# Patient Record
Sex: FEMALE | Race: WHITE | URBAN - METROPOLITAN AREA
[De-identification: names, ages, dates, MRNs, and addresses within clinical notes are randomized per-mention and may not be internally consistent; named-entity substitution may affect disease eponyms.]

---

## 2020-05-06 ENCOUNTER — VIRTUAL VISIT (OUTPATIENT)
Dept: FAMILY MEDICINE CLINIC | Age: 55
End: 2020-05-06

## 2020-05-06 ENCOUNTER — TELEPHONE (OUTPATIENT)
Dept: FAMILY MEDICINE CLINIC | Age: 55
End: 2020-05-06

## 2020-05-06 DIAGNOSIS — K21.9 GASTROESOPHAGEAL REFLUX DISEASE WITHOUT ESOPHAGITIS: ICD-10-CM

## 2020-05-06 DIAGNOSIS — I10 ESSENTIAL HYPERTENSION: Primary | ICD-10-CM

## 2020-05-06 RX ORDER — HYDROCHLOROTHIAZIDE 12.5 MG/1
12.5 TABLET ORAL DAILY
Qty: 30 TAB | Refills: 1 | Status: SHIPPED | OUTPATIENT
Start: 2020-05-06 | End: 2020-06-29

## 2020-05-06 RX ORDER — PRAZOSIN HYDROCHLORIDE 1 MG/1
1 CAPSULE ORAL
COMMUNITY

## 2020-05-06 RX ORDER — DILTIAZEM HYDROCHLORIDE 240 MG/1
240 CAPSULE, EXTENDED RELEASE ORAL DAILY
Qty: 30 CAP | Refills: 1 | Status: SHIPPED | OUTPATIENT
Start: 2020-05-06 | End: 2020-06-29

## 2020-05-06 RX ORDER — BUSPIRONE HYDROCHLORIDE 30 MG/1
30 TABLET ORAL DAILY
COMMUNITY

## 2020-05-06 RX ORDER — LISINOPRIL 40 MG/1
40 TABLET ORAL DAILY
COMMUNITY
End: 2020-05-06 | Stop reason: SDUPTHER

## 2020-05-06 RX ORDER — FAMOTIDINE 40 MG/1
40 TABLET, FILM COATED ORAL DAILY
Qty: 30 TAB | Refills: 1 | Status: SHIPPED | OUTPATIENT
Start: 2020-05-06 | End: 2020-06-29

## 2020-05-06 RX ORDER — LISINOPRIL 40 MG/1
40 TABLET ORAL DAILY
Qty: 30 TAB | Refills: 1 | Status: SHIPPED | OUTPATIENT
Start: 2020-05-06 | End: 2020-06-29

## 2020-05-06 RX ORDER — PANTOPRAZOLE SODIUM 40 MG/1
40 TABLET, DELAYED RELEASE ORAL DAILY
COMMUNITY

## 2020-05-06 RX ORDER — ALBUTEROL SULFATE 0.83 MG/ML
SOLUTION RESPIRATORY (INHALATION) ONCE
COMMUNITY

## 2020-05-06 RX ORDER — PREGABALIN 100 MG/1
100 CAPSULE ORAL 2 TIMES DAILY
COMMUNITY
End: 2020-05-07 | Stop reason: ALTCHOICE

## 2020-05-06 RX ORDER — DILTIAZEM HYDROCHLORIDE 240 MG/1
240 CAPSULE, EXTENDED RELEASE ORAL DAILY
COMMUNITY
End: 2020-05-06 | Stop reason: SDUPTHER

## 2020-05-06 RX ORDER — DULOXETIN HYDROCHLORIDE 60 MG/1
60 CAPSULE, DELAYED RELEASE ORAL DAILY
COMMUNITY
End: 2020-05-12 | Stop reason: SDUPTHER

## 2020-05-06 RX ORDER — CLONAZEPAM 0.25 MG/1
0.25 TABLET, ORALLY DISINTEGRATING ORAL
COMMUNITY
End: 2020-05-12 | Stop reason: ALTCHOICE

## 2020-05-06 NOTE — PROGRESS NOTES
Bee New is a 48 y.o. female who was seen by synchronous (real-time) audio-video technology on 5/6/2020. Consent: Bee New, who was seen by synchronous (real-time) audio-video technology, and/or her healthcare decision maker, is aware that this patient-initiated, Telehealth encounter on 5/6/2020 is a billable service, with coverage as determined by her insurance carrier. She is aware that she may receive a bill and has provided verbal consent to proceed: Yes. I spent at least 23 minutes on this visit with this established patient. [phone]  Subjective:   Bee New is a 48 y.o. female who was seen for Medication Refill  new patient moved from [place]  Has bp meds that she needs refills of  bp has been high but out of her hctz, bp 150/100  See med list for updated regimen  Also having break through gerd on protonix      Prior to Admission medications    Medication Sig Start Date End Date Taking? Authorizing Provider   lurasidone (Latuda) 40 mg tab tablet Take 40 mg by mouth. Yes Provider, Historical   busPIRone (BUSPAR) 30 mg tablet Take 30 mg by mouth daily. Yes Provider, Historical   clonazePAM (KlonoPIN) 0.25 mg TbDi Take 0.25 mg by mouth. Yes Provider, Historical   prazosin (MINIPRESS) 1 mg capsule Take 1 mg by mouth nightly. Yes Provider, Historical   pregabalin (LYRICA) 100 mg capsule Take 100 mg by mouth two (2) times a day. Yes Provider, Historical   DULoxetine (CYMBALTA) 60 mg capsule Take 60 mg by mouth daily. Yes Provider, Historical   pantoprazole (PROTONIX) 40 mg tablet Take 40 mg by mouth daily. Yes Provider, Historical   albuterol (PROVENTIL VENTOLIN) 2.5 mg /3 mL (0.083 %) nebu by Nebulization route once. Yes Provider, Historical   dilTIAZem ER (DILACOR XR) 240 mg capsule Take 1 Cap by mouth daily. 5/6/20  Yes Shania French NP   lisinopriL (PRINIVIL, ZESTRIL) 40 mg tablet Take 1 Tab by mouth daily.  5/6/20  Yes Shania French NP   hydroCHLOROthiazide (HYDRODIURIL) 12.5 mg tablet Take 1 Tab by mouth daily. 5/6/20  Yes Otis Tello NP   famotidine (PEPCID) 40 mg tablet Take 1 Tab by mouth daily. 5/6/20  Yes Otis Tello NP   dilTIAZem ER (DILACOR XR) 240 mg capsule Take 240 mg by mouth daily. 5/6/20  Provider, Historical   lisinopriL (PRINIVIL, ZESTRIL) 40 mg tablet Take 40 mg by mouth daily. 5/6/20  Provider, Historical     Allergies   Allergen Reactions    Tramadol Anaphylaxis    Aspirin Hives       There are no active problems to display for this patient. ROS  A comprehensive review of system was obtained and negative except findings in the HPI      Objective: There were no vitals taken for this visit. General: alert, cooperative, no distress   Mental  status: normal mood, behavior, speech, dress, motor activity, and thought processes, able to follow commands   HENT: NCAT   Neck: no visualized mass   Resp: no respiratory distress   Neuro: no gross deficits   Skin: no discoloration or lesions of concern on visible areas   Psychiatric: normal affect, consistent with stated mood, no evidence of hallucinations     Additional exam findings: none    Diagnoses and all orders for this visit:    1. Essential hypertension    2. Gastroesophageal reflux disease without esophagitis    Other orders  -     dilTIAZem ER (DILACOR XR) 240 mg capsule; Take 1 Cap by mouth daily. -     lisinopriL (PRINIVIL, ZESTRIL) 40 mg tablet; Take 1 Tab by mouth daily. -     hydroCHLOROthiazide (HYDRODIURIL) 12.5 mg tablet; Take 1 Tab by mouth daily. -     famotidine (PEPCID) 40 mg tablet; Take 1 Tab by mouth daily. Add pepcid for gerd  Will refer to GI when office reopens  Refilled bp meds and added back the hctz  Will update meds/labs when office reopens  Will seek psych referral to get those meds updated      We discussed the expected course, resolution and complications of the diagnosis(es) in detail.   Medication risks, benefits, costs, interactions, and alternatives were discussed as indicated. I advised her to contact the office if her condition worsens, changes or fails to improve as anticipated. She expressed understanding with the diagnosis(es) and plan. Raj Skaggs is a 48 y.o. female who was evaluated by a video visit encounter for concerns as above. Patient identification was verified prior to start of the visit. A caregiver was present when appropriate. Due to this being a TeleHealth encounter (During XJZDW-24 public health emergency), evaluation of the following organ systems was limited: Vitals/Constitutional/EENT/Resp/CV/GI//MS/Neuro/Skin/Heme-Lymph-Imm. Pursuant to the emergency declaration under the [facility], 1135 waiver authority and the [employer] and [employer] Act, this Virtual  Visit was conducted, with patient's (and/or legal guardian's) consent, to reduce the patient's risk of exposure to COVID-19 and provide necessary medical care. Services were provided through a video synchronous discussion virtually to substitute for in-person clinic visit. Patient and provider were located at their individual homes.       Augustine Osbornr, KENNEY

## 2020-05-07 ENCOUNTER — TELEPHONE (OUTPATIENT)
Dept: FAMILY MEDICINE CLINIC | Age: 55
End: 2020-05-07

## 2020-05-07 DIAGNOSIS — M51.36 DDD (DEGENERATIVE DISC DISEASE), LUMBAR: Primary | ICD-10-CM

## 2020-05-07 RX ORDER — GABAPENTIN 300 MG/1
300 CAPSULE ORAL 2 TIMES DAILY
Qty: 60 CAP | Refills: 2 | Status: SHIPPED | OUTPATIENT
Start: 2020-05-07 | End: 2020-07-28

## 2020-05-12 ENCOUNTER — VIRTUAL VISIT (OUTPATIENT)
Dept: FAMILY MEDICINE CLINIC | Age: 55
End: 2020-05-12

## 2020-05-12 DIAGNOSIS — F41.9 ANXIETY: Primary | ICD-10-CM

## 2020-05-12 RX ORDER — CLONAZEPAM 0.5 MG/1
0.5 TABLET ORAL
Qty: 60 TAB | Refills: 0 | Status: SHIPPED | OUTPATIENT
Start: 2020-05-12 | End: 2020-06-10 | Stop reason: SDUPTHER

## 2020-05-12 RX ORDER — DULOXETIN HYDROCHLORIDE 60 MG/1
60 CAPSULE, DELAYED RELEASE ORAL DAILY
Qty: 60 CAP | Refills: 0 | Status: SHIPPED | OUTPATIENT
Start: 2020-05-12 | End: 2020-07-05

## 2020-05-12 NOTE — PROGRESS NOTES
Mary Weaver is a 48 y.o. female who was seen by synchronous (real-time) audio-video technology on 2020. Consent: Mary Weaver, who was seen by synchronous (real-time) audio-video technology, and/or her healthcare decision maker, is aware that this patient-initiated, Telehealth encounter on 2020 is a billable service, with coverage as determined by her insurance carrier. She is aware that she may receive a bill and has provided verbal consent to proceed: Yes. I spent at least 23 minutes on this visit with this established patient. [id number]  Subjective:   Mary Weaver is a 48 y.o. female who was seen for Anxiety  unable to get refills of meds for anxiety  Mom  last mo and mothers day was rough emotionally  Has not had her anxiety meds in over a month since moving her to live with daughter   reviewed    Prior to Admission medications    Medication Sig Start Date End Date Taking? Authorizing Provider   DULoxetine (CYMBALTA) 60 mg capsule Take 1 Cap by mouth daily. 20  Yes Isabel Buchanan NP   clonazePAM (KlonoPIN) 0.5 mg tablet Take 1 Tab by mouth two (2) times daily as needed for Anxiety. Max Daily Amount: 1 mg. 20  Yes Isabel Buchanan NP   gabapentin (NEURONTIN) 300 mg capsule Take 1 Cap by mouth two (2) times a day. Max Daily Amount: 600 mg. 20   Isabel Buchanan NP   lurasidone Orpah Savers) 40 mg tab tablet Take 40 mg by mouth. Provider, Historical   busPIRone (BUSPAR) 30 mg tablet Take 30 mg by mouth daily. Provider, Historical   prazosin (MINIPRESS) 1 mg capsule Take 1 mg by mouth nightly. Provider, Historical   pantoprazole (PROTONIX) 40 mg tablet Take 40 mg by mouth daily. Provider, Historical   albuterol (PROVENTIL VENTOLIN) 2.5 mg /3 mL (0.083 %) nebu by Nebulization route once. Provider, Historical   dilTIAZem ER (DILACOR XR) 240 mg capsule Take 1 Cap by mouth daily.  20   Isabel Buchanan NP   lisinopriL (PRINIVIL, ZESTRIL) 40 mg tablet Take 1 Tab by mouth daily. 5/6/20   Ben Argueta NP   hydroCHLOROthiazide (HYDRODIURIL) 12.5 mg tablet Take 1 Tab by mouth daily. 5/6/20   Ben Argueta NP   famotidine (PEPCID) 40 mg tablet Take 1 Tab by mouth daily. 5/6/20   Ben Argueta NP   clonazePAM (KlonoPIN) 0.25 mg TbDi Take 0.25 mg by mouth. 5/12/20  Provider, Historical   DULoxetine (CYMBALTA) 60 mg capsule Take 60 mg by mouth daily. 5/12/20  Provider, Historical     Allergies   Allergen Reactions    Tramadol Anaphylaxis    Aspirin Hives       There are no active problems to display for this patient. ROS      Objective: There were no vitals taken for this visit. General: alert, cooperative, no distress   Mental  status: normal mood, behavior, speech, dress, motor activity, and thought processes, able to follow commands   HENT: NCAT   Neck: no visualized mass   Resp: no respiratory distress   Neuro: no gross deficits   Skin: no discoloration or lesions of concern on visible areas   Psychiatric: normal affect, consistent with stated mood, no evidence of hallucinations     Additional exam findings: none    Diagnoses and all orders for this visit:    1. Anxiety  -     clonazePAM (KlonoPIN) 0.5 mg tablet; Take 1 Tab by mouth two (2) times daily as needed for Anxiety. Max Daily Amount: 1 mg. Other orders  -     DULoxetine (CYMBALTA) 60 mg capsule; Take 1 Cap by mouth daily. Refills x 1 mo until she can see psych      We discussed the expected course, resolution and complications of the diagnosis(es) in detail. Medication risks, benefits, costs, interactions, and alternatives were discussed as indicated. I advised her to contact the office if her condition worsens, changes or fails to improve as anticipated. She expressed understanding with the diagnosis(es) and plan. Sydnee Jain is a 48 y.o. female who was evaluated by a video visit encounter for concerns as above.  Patient identification was verified prior to start of the visit. A caregiver was present when appropriate. Due to this being a TeleHealth encounter (During MTEMF-21 public health emergency), evaluation of the following organ systems was limited: Vitals/Constitutional/EENT/Resp/CV/GI//MS/Neuro/Skin/Heme-Lymph-Imm. Pursuant to the emergency declaration under the [address] [place] [address] waiver authority and the [employer] and [employer] Act, this Virtual  Visit was conducted, with patient's (and/or legal guardian's) consent, to reduce the patient's risk of exposure to COVID-19 and provide necessary medical care. Services were provided through a video synchronous discussion virtually to substitute for in-person clinic visit. Patient and provider were located at their individual homes.       Gabriele Vela NP

## 2020-05-21 ENCOUNTER — VIRTUAL VISIT (OUTPATIENT)
Dept: FAMILY MEDICINE CLINIC | Age: 55
End: 2020-05-21

## 2020-05-21 DIAGNOSIS — M51.36 DDD (DEGENERATIVE DISC DISEASE), LUMBAR: Primary | ICD-10-CM

## 2020-05-21 DIAGNOSIS — G25.81 RLS (RESTLESS LEGS SYNDROME): ICD-10-CM

## 2020-05-21 RX ORDER — ROPINIROLE 5 MG/1
5 TABLET, FILM COATED ORAL
Qty: 30 TAB | Refills: 2 | Status: SHIPPED | OUTPATIENT
Start: 2020-05-21 | End: 2020-07-25 | Stop reason: SDUPTHER

## 2020-05-21 RX ORDER — HYDROCODONE BITARTRATE AND ACETAMINOPHEN 7.5; 325 MG/1; MG/1
1 TABLET ORAL
Qty: 20 TAB | Refills: 0 | Status: SHIPPED | OUTPATIENT
Start: 2020-05-21 | End: 2020-06-10 | Stop reason: SDUPTHER

## 2020-05-21 NOTE — PROGRESS NOTES
Aaron Burciaga is a 48 y.o. female who was seen by synchronous (real-time) audio-video technology on 5/21/2020. Consent: Aaron Burciaga, who was seen by synchronous (real-time) audio-video technology, and/or her healthcare decision maker, is aware that this patient-initiated, Telehealth encounter on 5/21/2020 is a billable service, with coverage as determined by her insurance carrier. She is aware that she may receive a bill and has provided verbal consent to proceed: Yes. I spent at least 23 minutes on this visit with this established patient. [phone]  Subjective:   Aaron Burciaga is a 48 y.o. female who was seen for No chief complaint on file. she is trying to get PMR appt, daughter sees Dr. Red Yang  She has run out of her norco and MD in [facility] can't fill across state lines  Needs new Requip as well for RLS   reviewed    Prior to Admission medications    Medication Sig Start Date End Date Taking? Authorizing Provider   rOPINIRole (REQUIP) 5 mg tablet Take 1 Tab by mouth nightly. 5/21/20  Yes Dusty Mcwilliams NP   HYDROcodone-acetaminophen (NORCO) 7.5-325 mg per tablet Take 1 Tab by mouth two (2) times daily as needed for Pain for up to 30 days. Max Daily Amount: 2 Tabs. 5/21/20 6/20/20 Yes Dusty Mcwilliams NP   DULoxetine (CYMBALTA) 60 mg capsule Take 1 Cap by mouth daily. 5/12/20   Dusty Mcwilliams NP   clonazePAM (KlonoPIN) 0.5 mg tablet Take 1 Tab by mouth two (2) times daily as needed for Anxiety. Max Daily Amount: 1 mg. 5/12/20   Dusty Mcwilliams NP   gabapentin (NEURONTIN) 300 mg capsule Take 1 Cap by mouth two (2) times a day. Max Daily Amount: 600 mg. 5/7/20   Dusty Mcwilliams NP   lurasidone Donold Minors) 40 mg tab tablet Take 40 mg by mouth. Provider, Historical   busPIRone (BUSPAR) 30 mg tablet Take 30 mg by mouth daily. Provider, Historical   prazosin (MINIPRESS) 1 mg capsule Take 1 mg by mouth nightly.     Provider, Historical   pantoprazole (PROTONIX) 40 mg tablet Take 40 mg by mouth daily. Provider, Historical   albuterol (PROVENTIL VENTOLIN) 2.5 mg /3 mL (0.083 %) nebu by Nebulization route once. Provider, Historical   dilTIAZem ER (DILACOR XR) 240 mg capsule Take 1 Cap by mouth daily. 5/6/20   Shauna Kenny NP   lisinopriL (PRINIVIL, ZESTRIL) 40 mg tablet Take 1 Tab by mouth daily. 5/6/20   Shauna Kenny NP   hydroCHLOROthiazide (HYDRODIURIL) 12.5 mg tablet Take 1 Tab by mouth daily. 5/6/20   Shauna Zoya NP   famotidine (PEPCID) 40 mg tablet Take 1 Tab by mouth daily. 5/6/20   Shauna Kenny NP     Allergies   Allergen Reactions    Tramadol Anaphylaxis    Aspirin Hives       Patient Active Problem List    Diagnosis Date Noted    RLS (restless legs syndrome) 05/21/2020    DDD (degenerative disc disease), lumbar 05/21/2020       ROS  A comprehensive review of system was obtained and negative except findings in the HPI    Objective: There were no vitals taken for this visit. General: alert, cooperative, no distress   Mental  status: normal mood, behavior, speech, dress, motor activity, and thought processes, able to follow commands   HENT: NCAT   Neck: no visualized mass   Resp: no respiratory distress   Neuro: no gross deficits   Skin: no discoloration or lesions of concern on visible areas   Psychiatric: normal affect, consistent with stated mood, no evidence of hallucinations     Additional exam findings: none    Diagnoses and all orders for this visit:    1. DDD (degenerative disc disease), lumbar  -     HYDROcodone-acetaminophen (NORCO) 7.5-325 mg per tablet; Take 1 Tab by mouth two (2) times daily as needed for Pain for up to 30 days. Max Daily Amount: 2 Tabs. 2. RLS (restless legs syndrome)    Other orders  -     rOPINIRole (REQUIP) 5 mg tablet; Take 1 Tab by mouth nightly.       Refills updated with 20 pills only of norco  Advised to make appt with Dr. Natalia Ruiz, will not give anymore pain meds  Refilled requip      We discussed the expected course, resolution and complications of the diagnosis(es) in detail. Medication risks, benefits, costs, interactions, and alternatives were discussed as indicated. I advised her to contact the office if her condition worsens, changes or fails to improve as anticipated. She expressed understanding with the diagnosis(es) and plan. Jeny Nguyen is a 48 y.o. female who was evaluated by a video visit encounter for concerns as above. Patient identification was verified prior to start of the visit. A caregiver was present when appropriate. Due to this being a TeleHealth encounter (During [facility]- public health emergency), evaluation of the following organ systems was limited: Vitals/Constitutional/EENT/Resp/CV/GI//MS/Neuro/Skin/Heme-Lymph-Imm. Pursuant to the emergency declaration under the [facility], 1135 waiver authority and the [employer] Act, this Virtual  Visit was conducted, with patient's (and/or legal guardian's) consent, to reduce the patient's risk of exposure to COVID-19 and provide necessary medical care. Services were provided through a video synchronous discussion virtually to substitute for in-person clinic visit. Patient and provider were located at their individual homes.       Andrade Dove NP

## 2020-06-10 ENCOUNTER — VIRTUAL VISIT (OUTPATIENT)
Dept: FAMILY MEDICINE CLINIC | Age: 55
End: 2020-06-10

## 2020-06-10 DIAGNOSIS — M51.36 DDD (DEGENERATIVE DISC DISEASE), LUMBAR: ICD-10-CM

## 2020-06-10 DIAGNOSIS — F41.9 ANXIETY: Primary | ICD-10-CM

## 2020-06-10 RX ORDER — CLONAZEPAM 0.5 MG/1
0.5 TABLET ORAL
Qty: 10 TAB | Refills: 0 | Status: SHIPPED | OUTPATIENT
Start: 2020-06-10 | End: 2020-06-15

## 2020-06-10 RX ORDER — HYDROCODONE BITARTRATE AND ACETAMINOPHEN 7.5; 325 MG/1; MG/1
1 TABLET ORAL
Qty: 10 TAB | Refills: 0 | Status: SHIPPED | OUTPATIENT
Start: 2020-06-10 | End: 2020-06-15

## 2020-06-10 NOTE — PROGRESS NOTES
Des Mak is a 48 y.o. female who was seen by synchronous (real-time) audio-video technology on 6/10/2020. Consent: Des Mak, who was seen by synchronous (real-time) audio-video technology, and/or her healthcare decision maker, is aware that this patient-initiated, Telehealth encounter on 6/10/2020 is a billable service, with coverage as determined by her insurance carrier. She is aware that she may receive a bill and has provided verbal consent to proceed: Yes. I spent at least 15 minutes on this visit with this established patient. [id number]  Subjective:   Des Mak is a 48 y.o. female who was seen for Medication Refill  she is requesting her meds for anxiety and pain  Told last visit that I would give her a 30 day supply only  She has only made appt with PMR, not psych  Requests refills again      Prior to Admission medications    Medication Sig Start Date End Date Taking? Authorizing Provider   clonazePAM (KlonoPIN) 0.5 mg tablet Take 1 Tab by mouth two (2) times daily as needed for Anxiety for up to 5 days. - for use as taper 6/10/20 6/15/20 Yes Shira Headley NP   HYDROcodone-acetaminophen (NORCO) 7.5-325 mg per tablet Take 1 Tab by mouth two (2) times daily as needed for Pain for up to 5 days. - for use as taper 6/10/20 6/15/20 Yes Shira Headley NP   rOPINIRole (REQUIP) 5 mg tablet Take 1 Tab by mouth nightly. 5/21/20   Shira Headley NP   HYDROcodone-acetaminophen (NORCO) 7.5-325 mg per tablet Take 1 Tab by mouth two (2) times daily as needed for Pain for up to 30 days. Max Daily Amount: 2 Tabs. 5/21/20 6/10/20  Shira Headley NP   DULoxetine (CYMBALTA) 60 mg capsule Take 1 Cap by mouth daily. 5/12/20   Shira Headley NP   clonazePAM (KlonoPIN) 0.5 mg tablet Take 1 Tab by mouth two (2) times daily as needed for Anxiety. Max Daily Amount: 1 mg. 5/12/20 6/10/20  Shira Headley NP   gabapentin (NEURONTIN) 300 mg capsule Take 1 Cap by mouth two (2) times a day.  Max Daily Amount: 600 mg. 5/7/20   Roshni Dyers, NP   lurasidone Hurtis Davis) 40 mg tab tablet Take 40 mg by mouth. Provider, Historical   busPIRone (BUSPAR) 30 mg tablet Take 30 mg by mouth daily. Provider, Historical   prazosin (MINIPRESS) 1 mg capsule Take 1 mg by mouth nightly. Provider, Historical   pantoprazole (PROTONIX) 40 mg tablet Take 40 mg by mouth daily. Provider, Historical   albuterol (PROVENTIL VENTOLIN) 2.5 mg /3 mL (0.083 %) nebu by Nebulization route once. Provider, Historical   dilTIAZem ER (DILACOR XR) 240 mg capsule Take 1 Cap by mouth daily. 5/6/20   Roshni Dyers, NP   lisinopriL (PRINIVIL, ZESTRIL) 40 mg tablet Take 1 Tab by mouth daily. 5/6/20   Roshni Dyers, NP   hydroCHLOROthiazide (HYDRODIURIL) 12.5 mg tablet Take 1 Tab by mouth daily. 5/6/20   Roshni Dyers, NP   famotidine (PEPCID) 40 mg tablet Take 1 Tab by mouth daily. 5/6/20   Roshni Dyers, NP     Allergies   Allergen Reactions    Tramadol Anaphylaxis    Aspirin Hives       Patient Active Problem List    Diagnosis Date Noted    RLS (restless legs syndrome) 05/21/2020    DDD (degenerative disc disease), lumbar 05/21/2020       ROS  A comprehensive review of system was obtained and negative except findings in the HPI      Objective: There were no vitals taken for this visit. General: alert, cooperative, no distress   Mental  status: normal mood, behavior, speech, dress, motor activity, and thought processes, able to follow commands   HENT: NCAT   Neck: no visualized mass   Resp: no respiratory distress   Neuro: no gross deficits   Skin: no discoloration or lesions of concern on visible areas   Psychiatric: normal affect, consistent with stated mood, no evidence of hallucinations     Additional exam findings: none    Diagnoses and all orders for this visit:    1. Anxiety  -     clonazePAM (KlonoPIN) 0.5 mg tablet; Take 1 Tab by mouth two (2) times daily as needed for Anxiety for up to 5 days.  - for use as taper    2. DDD (degenerative disc disease), lumbar  -     HYDROcodone-acetaminophen (NORCO) 7.5-325 mg per tablet; Take 1 Tab by mouth two (2) times daily as needed for Pain for up to 5 days. - for use as taper      Refills given for 5 days only to use as a taper  She was told last visit that she would be given 1 mo only  She needs to use these meds as a taper until she can set up her appts    I have discussed the diagnosis with the patient and the intended plan as seen in the above orders. The patient has received an after-visit summary and questions were answered concerning future plans. Patient conveyed understanding of the plan at the time of the visit. Randle Hashimoto, MSN, ANP  6/10/2020          We discussed the expected course, resolution and complications of the diagnosis(es) in detail. Medication risks, benefits, costs, interactions, and alternatives were discussed as indicated. I advised her to contact the office if her condition worsens, changes or fails to improve as anticipated. She expressed understanding with the diagnosis(es) and plan. Olena eD La Cruz is a 48 y.o. female who was evaluated by a video visit encounter for concerns as above. Patient identification was verified prior to start of the visit. A caregiver was present when appropriate. Due to this being a TeleHealth encounter (During URI-84 public health emergency), evaluation of the following organ systems was limited: Vitals/Constitutional/EENT/Resp/CV/GI//MS/Neuro/Skin/Heme-Lymph-Imm. Pursuant to the emergency declaration under the [facility], 1135 waiver authority and the [employer] Act, this Virtual  Visit was conducted, with patient's (and/or legal guardian's) consent, to reduce the patient's risk of exposure to COVID-19 and provide necessary medical care.      Services were provided through a video synchronous discussion virtually to substitute for in-person clinic visit. Patient and provider were located at their individual homes.       Reyna Pena NP

## 2020-06-29 RX ORDER — HYDROCHLOROTHIAZIDE 12.5 MG/1
TABLET ORAL
Qty: 30 TAB | Refills: 1 | Status: SHIPPED | OUTPATIENT
Start: 2020-06-29 | End: 2020-08-25

## 2020-06-29 RX ORDER — LISINOPRIL 40 MG/1
TABLET ORAL
Qty: 30 TAB | Refills: 1 | Status: SHIPPED | OUTPATIENT
Start: 2020-06-29 | End: 2020-09-01

## 2020-06-29 RX ORDER — FAMOTIDINE 40 MG/1
TABLET, FILM COATED ORAL
Qty: 30 TAB | Refills: 1 | Status: SHIPPED | OUTPATIENT
Start: 2020-06-29

## 2020-06-29 RX ORDER — DILTIAZEM HYDROCHLORIDE 240 MG/1
CAPSULE, EXTENDED RELEASE ORAL
Qty: 30 CAP | Refills: 1 | Status: SHIPPED | OUTPATIENT
Start: 2020-06-29 | End: 2020-08-25

## 2020-07-05 RX ORDER — DULOXETIN HYDROCHLORIDE 60 MG/1
CAPSULE, DELAYED RELEASE ORAL
Qty: 60 CAP | Refills: 1 | Status: SHIPPED | OUTPATIENT
Start: 2020-07-05

## 2020-07-10 ENCOUNTER — TELEPHONE (OUTPATIENT)
Dept: FAMILY MEDICINE CLINIC | Age: 55
End: 2020-07-10

## 2020-07-10 NOTE — TELEPHONE ENCOUNTER
----- Message from Hugo Harris sent at 7/10/2020 11:07 AM EDT -----  Regarding: Do Lobb/Refill  Medication/Refill    : 69    Caller: n/a    Phone: [phone]    Medication: \"trilogy powder inhaler\";  \"Symbicort inhaler\";    \"albuterol inhaler solution\"      Is the pt out of the RX? yes    [facility], [facility]    Is pharmacy on the pt's chart: yes      Phone: [phone]    Last office visit: 6/10/20    Details: n/a

## 2020-07-13 NOTE — TELEPHONE ENCOUNTER
Spoke with pt id x3 she states her doctor in [facility] wrote them, tried calling back to get the dosage went straight to vm

## 2020-07-16 ENCOUNTER — TELEPHONE (OUTPATIENT)
Dept: FAMILY MEDICINE CLINIC | Age: 55
End: 2020-07-16

## 2020-07-16 NOTE — TELEPHONE ENCOUNTER
Refer to Dr. Lynne Tariq for pain management. She will have to go to [facility] for psych.  Nona Dykes

## 2020-07-16 NOTE — TELEPHONE ENCOUNTER
Patient called and stated that she needs referrals to the pain dr and a psychiatrist for her pain meds.   Please call her back and advise at [phone]

## 2020-07-17 NOTE — TELEPHONE ENCOUNTER
----- Message from Eirn Brown sent at 7/16/2020  5:43 PM EDT -----  Regarding: KENNEY Thompson/Telephone  Patient return call    Caller's first and last name and relationship (if not the patient):  pt    Best contact number(s):  [phone]    Whose call is being returned: The office    Details to clarify the request:  Pt returning a miss call.      Erin Brown

## 2020-07-21 LAB — SARS-COV-2, NAA: NEGATIVE

## 2020-07-24 ENCOUNTER — TELEPHONE (OUTPATIENT)
Dept: FAMILY MEDICINE CLINIC | Age: 55
End: 2020-07-24

## 2020-07-24 DIAGNOSIS — J44.9 CHRONIC OBSTRUCTIVE PULMONARY DISEASE, UNSPECIFIED COPD TYPE (HCC): Primary | ICD-10-CM

## 2020-07-24 RX ORDER — FLUTICASONE PROPIONATE AND SALMETEROL 250; 50 UG/1; UG/1
1 POWDER RESPIRATORY (INHALATION) 2 TIMES DAILY
Qty: 60 EACH | Refills: 2 | Status: SHIPPED | OUTPATIENT
Start: 2020-07-24

## 2020-07-24 NOTE — TELEPHONE ENCOUNTER
Per prior PCP records pt has been on Advair Diskus, albuterol HFA, Duo-neb, spirionolactone, and Trelegy Ellipta.

## 2020-07-24 NOTE — TELEPHONE ENCOUNTER
Pt daughter Lynne Warren calling and needs to speak to someone about getting pt's new rx for her inhaler for her copd problems. She states that her previous pcp sent records back on 5/20 and she states Titi Cat was to review them first before any medications can be given. Sending to nurse to review chart. She states needs something to call her back today at [phone] due to pt is having difficulty with her breathing due to the weather.

## 2020-07-24 NOTE — TELEPHONE ENCOUNTER
Attempt to reach pt unsuccessful. LVM for patient to [facility] to notify as per Tavares Alonso.

## 2020-07-25 RX ORDER — ROPINIROLE 5 MG/1
TABLET, FILM COATED ORAL
Qty: 30 TAB | Refills: 0 | Status: SHIPPED | OUTPATIENT
Start: 2020-07-25 | End: 2020-08-27

## 2020-07-26 DIAGNOSIS — M51.36 DDD (DEGENERATIVE DISC DISEASE), LUMBAR: ICD-10-CM

## 2020-07-28 RX ORDER — GABAPENTIN 300 MG/1
CAPSULE ORAL
Qty: 60 CAP | Refills: 2 | Status: SHIPPED | OUTPATIENT
Start: 2020-07-28

## 2020-07-30 NOTE — TELEPHONE ENCOUNTER
Pt states she did receive inhaler from pharmacy was just a generic name for Advair, no action is required by nurse at this time.

## 2020-07-30 NOTE — TELEPHONE ENCOUNTER
----- Message from Carter Grullon sent at 2020 10:11 AM EDT -----  Regarding: MICAELA/KENNEY/TELEPHONE  Contact: [phone]   Return Call    Patient's first and last name: Kenyatta Peña    : 1965      Caller's first and last name and relationship (if not the patient): N/A  Best contact number(s): [phone]    Whose call is being returned: Osmar Persaud    Details to clarify the request: Patient returning a missed call from Osmar Persaud

## 2020-08-12 ENCOUNTER — VIRTUAL VISIT (OUTPATIENT)
Dept: FAMILY MEDICINE CLINIC | Age: 55
End: 2020-08-12
Payer: MEDICARE

## 2020-08-12 DIAGNOSIS — F31.9 BIPOLAR 1 DISORDER (HCC): ICD-10-CM

## 2020-08-12 DIAGNOSIS — F41.9 ANXIETY: Primary | ICD-10-CM

## 2020-08-12 PROCEDURE — G8432 DEP SCR NOT DOC, RNG: HCPCS | Performed by: NURSE PRACTITIONER

## 2020-08-12 PROCEDURE — 99213 OFFICE O/P EST LOW 20 MIN: CPT | Performed by: NURSE PRACTITIONER

## 2020-08-12 PROCEDURE — G8756 NO BP MEASURE DOC: HCPCS | Performed by: NURSE PRACTITIONER

## 2020-08-12 PROCEDURE — 3017F COLORECTAL CA SCREEN DOC REV: CPT | Performed by: NURSE PRACTITIONER

## 2020-08-12 PROCEDURE — G8427 DOCREV CUR MEDS BY ELIG CLIN: HCPCS | Performed by: NURSE PRACTITIONER

## 2020-08-13 NOTE — PROGRESS NOTES
Dinora Sesay is a 50 y.o. female who was seen by synchronous (real-time) audio-video technology on 8/12/2020 for No chief complaint on file. I spent at least 15 minutes on this visit with this established patient. 712  Subjective:   Patient got in contact with her MD from [place] and had refills on file of her psych meds  Got her Latuda filled along with her anxiety meds  Feeling much better and mood is more stable during visit today  She does have concern that her daughter may be abusing her meds, she keeps them locked up and she does not have access to her own meds. I did have a very confrontational talk with daughter on vv today, advised that I was concerned about her behavior and confusion during the vv and made her count out the patients klonopin to look for accountability. There were pills missing. Prior to Admission medications    Medication Sig Start Date End Date Taking? Authorizing Provider   gabapentin (NEURONTIN) 300 mg capsule TAKE 1 CAPSULE BY MOUTH TWICE A DAY. MAX DAILY AMOUNT: 2 CAPSULES 7/28/20   Juan Rachel NP   rOPINIRole (REQUIP) 5 mg tablet TAKE 1 TABLET BY MOUTH EVERY DAY AT NIGHT 7/25/20   Daniel Llamas NP   fluticasone propion-salmeteroL (Advair Diskus) 250-50 mcg/dose diskus inhaler Take 1 Puff by inhalation two (2) times a day. 7/24/20   Dez Lewis NP   DULoxetine (CYMBALTA) 60 mg capsule TAKE 1 CAPSULE BY MOUTH TWICE A DAY 7/5/20   Tracy Khan MD   famotidine (PEPCID) 40 mg tablet TAKE 1 TABLET BY MOUTH EVERY DAY 6/29/20   Dainel Llamas NP   hydroCHLOROthiazide (HYDRODIURIL) 12.5 mg tablet TAKE 1 TABLET BY MOUTH EVERY DAY 6/29/20   Daniel Llamas NP   lisinopriL (PRINIVIL, ZESTRIL) 40 mg tablet TAKE 1 TABLET BY MOUTH EVERY DAY 6/29/20   Daniel Llamas NP   DILT- mg capsule TAKE 1 CAPSULE BY MOUTH EVERY DAY 6/29/20   Daniel Llamas NP   lurasidone (Latuda) 40 mg tab tablet Take 40 mg by mouth.     Provider, Historical   busPIRone (BUSPAR) 30 mg tablet Take 30 mg by mouth daily. Provider, Historical   prazosin (MINIPRESS) 1 mg capsule Take 1 mg by mouth nightly. Provider, Historical   pantoprazole (PROTONIX) 40 mg tablet Take 40 mg by mouth daily. Provider, Historical   albuterol (PROVENTIL VENTOLIN) 2.5 mg /3 mL (0.083 %) nebu by Nebulization route once. Provider, Historical     Patient Active Problem List    Diagnosis Date Noted    RLS (restless legs syndrome) 05/21/2020    DDD (degenerative disc disease), lumbar 05/21/2020       ROS  A comprehensive review of system was obtained and negative except findings in the HPI      Objective:   No flowsheet data found. General: alert, cooperative, no distress   Mental  status: normal mood, behavior, speech, dress, motor activity, and thought processes, able to follow commands   HENT: NCAT   Neck: no visualized mass   Resp: no respiratory distress   Neuro: no gross deficits   Skin: no discoloration or lesions of concern on visible areas   Psychiatric: normal affect, consistent with stated mood, no evidence of hallucinations     Additional exam findings: none    Diagnoses and all orders for this visit:    1. Anxiety    2. Bipolar 1 disorder ([facility] [address]    after a hostile conversation with daughter the pills were found  She must find a pharmacy that will pill pack them for her so that there is no question who is taking them or suspicion of stolen meds  Daughter did seem out of it and admitted that she took her own klonopin for anxiety and pain meds for a bad tooth. We discussed the expected course, resolution and complications of the diagnosis(es) in detail. Medication risks, benefits, costs, interactions, and alternatives were discussed as indicated. I advised her to contact the office if her condition worsens, changes or fails to improve as anticipated. She expressed understanding with the diagnosis(es) and plan.        Janell Phalen, who was evaluated through a patient-initiated, synchronous (real-time) audio-video encounter, and/or her healthcare decision maker, is aware that it is a billable service, with coverage as determined by her insurance carrier. She provided verbal consent to proceed: Yes, and patient identification was verified. It was conducted pursuant to the emergency declaration under the [address] [place] [address] authority and the [employer] General Act. A caregiver was present when appropriate. Ability to conduct physical exam was limited. I was at home. The patient was at home.       Surekha Rios NP

## 2020-08-14 ENCOUNTER — TELEPHONE (OUTPATIENT)
Dept: FAMILY MEDICINE CLINIC | Age: 55
End: 2020-08-14

## 2020-08-14 DIAGNOSIS — M51.36 DDD (DEGENERATIVE DISC DISEASE), LUMBAR: Primary | ICD-10-CM

## 2020-08-14 NOTE — TELEPHONE ENCOUNTER
----- Message from Hugo Harris sent at 8/14/2020 10:09 AM EDT -----  Regarding: Elizabeth Thompson/Telephone  General Message/Vendor Calls    Caller's first and last name: n/a      Reason for call: to let Chago Redding know that her \"pregavalin is 100mg.       Callback required yes/no and why: yes      Best contact number(s):  [phone]      Details to clarify the request: n/a      Rosealee Corpus

## 2020-08-17 ENCOUNTER — TELEPHONE (OUTPATIENT)
Dept: FAMILY MEDICINE CLINIC | Age: 55
End: 2020-08-17

## 2020-08-18 RX ORDER — PREGABALIN 100 MG/1
100 CAPSULE ORAL DAILY
Qty: 30 CAP | Refills: 2 | Status: SHIPPED | OUTPATIENT
Start: 2020-08-18

## 2020-08-21 ENCOUNTER — TELEPHONE (OUTPATIENT)
Dept: FAMILY MEDICINE CLINIC | Age: 55
End: 2020-08-21

## 2020-08-21 NOTE — TELEPHONE ENCOUNTER
Pt calling back and thinks she might have pneumonia with her chest hurting and coughing. Explained for her to go to urgent care today because they can do chest xray at the visit. She understands.

## 2020-08-21 NOTE — TELEPHONE ENCOUNTER
Patient stated was tested for COVID and it came back negative. She is requesting antibiotics for a bad cough and cold and chest congestion. She would like albuterol as well. She has tried a hot toddy. Pharmacy on file. Patient would like a call back to discuss being sick.  Her phone: [phone]

## 2020-08-25 RX ORDER — DILTIAZEM HYDROCHLORIDE 240 MG/1
CAPSULE, EXTENDED RELEASE ORAL
Qty: 30 CAP | Refills: 1 | Status: SHIPPED | OUTPATIENT
Start: 2020-08-25

## 2020-08-25 RX ORDER — HYDROCHLOROTHIAZIDE 12.5 MG/1
TABLET ORAL
Qty: 30 TAB | Refills: 1 | Status: SHIPPED | OUTPATIENT
Start: 2020-08-25 | End: 2020-10-06 | Stop reason: SDUPTHER

## 2020-08-27 RX ORDER — ROPINIROLE 5 MG/1
TABLET, FILM COATED ORAL
Qty: 30 TAB | Refills: 0 | Status: SHIPPED | OUTPATIENT
Start: 2020-08-27 | End: 2020-09-23

## 2020-08-29 RX ORDER — FLUTICASONE FUROATE, UMECLIDINIUM BROMIDE AND VILANTEROL TRIFENATATE 100; 62.5; 25 UG/1; UG/1; UG/1
POWDER RESPIRATORY (INHALATION)
Qty: 1 INHALER | Refills: 3 | Status: SHIPPED | OUTPATIENT
Start: 2020-08-29

## 2020-08-30 LAB — SARS-COV-2, NAA: NEGATIVE

## 2020-08-31 ENCOUNTER — TELEPHONE (OUTPATIENT)
Dept: FAMILY MEDICINE CLINIC | Age: 55
End: 2020-08-31

## 2020-08-31 NOTE — TELEPHONE ENCOUNTER
Ashley/stephanie to patient- stated it was very important to speak to the nurse. She would not let me know what message was about.  Ashley's phone: [phone]

## 2020-09-01 RX ORDER — NYSTATIN 100000 [USP'U]/ML
2 SUSPENSION ORAL 4 TIMES DAILY
Qty: 280 ML | Refills: 0 | Status: SHIPPED | OUTPATIENT
Start: 2020-09-01 | End: 2020-09-08

## 2020-09-01 RX ORDER — LISINOPRIL 40 MG/1
TABLET ORAL
Qty: 30 TAB | Refills: 1 | Status: SHIPPED | OUTPATIENT
Start: 2020-09-01

## 2020-09-01 NOTE — TELEPHONE ENCOUNTER
Pt calling back and states forgot to tell provider that she has thrush in her mouth and needs something like nystatin for this. She can be reached at [phone].

## 2020-09-01 NOTE — TELEPHONE ENCOUNTER
Spoke with pt id x3 informed pt that provider states to stay on med and monitor.  Pt verbalized understanding

## 2020-09-01 NOTE — TELEPHONE ENCOUNTER
----- Message from Triny Cutler sent at 8/31/2020  8:29 PM EDT -----  Regarding: KENNEY Thompson/Telephone  General Message/Vendor Calls    Caller's first and last name:      Reason for call: Pt is reporting that she went to the ER at [facility] due to her (L) leg and foot swollen with no feeling. Pt was seen and was advised that there were no blood clots.  Pt was advised that she should stay for three days for observation, however pt left the hospital on her own and wanted to let KENNEY Flynn or her nurse know      Callback required yes/no and why: yes      Best contact number(s):[phone]      Details to clarify the request:      Triny Pinedao

## 2020-09-01 NOTE — TELEPHONE ENCOUNTER
Spoke with pt id x3 she states that she went to [facility], and they checked her for a blood clot. She states that today the swelling is better. Pt states that she is taking hydrochlorothiazide. She states she left the hospital bc they didn't see anything. She would like to know if there is anything else she should do.  I verbalized understanding and stated I would let provider know

## 2020-09-03 ENCOUNTER — TELEPHONE (OUTPATIENT)
Dept: FAMILY MEDICINE CLINIC | Age: 55
End: 2020-09-03

## 2020-09-03 DIAGNOSIS — M51.36 DDD (DEGENERATIVE DISC DISEASE), LUMBAR: Primary | ICD-10-CM

## 2020-09-03 RX ORDER — TRAMADOL HYDROCHLORIDE 50 MG/1
50 TABLET ORAL 2 TIMES DAILY
Qty: 60 TAB | Refills: 0 | Status: SHIPPED | OUTPATIENT
Start: 2020-09-03 | End: 2020-09-30 | Stop reason: SDUPTHER

## 2020-09-03 NOTE — TELEPHONE ENCOUNTER
----- Message from Esperanza Matos sent at 9/3/2020 10:05 AM EDT -----  Regarding: KENNEY Thompson/ refill  Patient's first and last name:  Tabitha Worley  :  1965      Caller (if not patient): pt    Relationship of caller (if not patient):  pt    Best contact number(s): [phone]    Name of medication and dosage if known: Tramadol    Is patient out of this medication (yes/no): yes    Pharmacy name: [id number] listed in chart? (yes/no): yes    Pharmacy phone number: n/a    Details to clarify the request: Pt would like to  requested medication refill to pharmacy. Pt is currently out of medication. Pt would like a call to confirm medication. Pt has an upcoming appointment on 2020.

## 2020-09-08 ENCOUNTER — TELEPHONE (OUTPATIENT)
Dept: FAMILY MEDICINE CLINIC | Age: 55
End: 2020-09-08

## 2020-09-08 NOTE — TELEPHONE ENCOUNTER
Spoke with pt, she states she is in the hospital because her O2 dropped down to 79 and another reason that I could not understand.  Pt is in [facility]

## 2020-09-08 NOTE — TELEPHONE ENCOUNTER
Patient is calling about her oxygen but I cannot understand what she is stating. She was in the hospital earlier. She would like to speak with the nurse. Patient's phone: [phone] Please see nurses message earlier today.

## 2020-09-09 NOTE — TELEPHONE ENCOUNTER
Patient was called for VV yesterday and was inpatient at that time, did she leave after that? Get more info.  [address] [place] [address]

## 2020-09-09 NOTE — TELEPHONE ENCOUNTER
----- Message from [place] sent at 9/8/2020  8:18 PM EDT -----  Regarding: Dr. Loc Viera telephone  Caller's first and last name: Archana Hodgkins [facility])  Reason for call: wanted to provide provider with information on pt  Callback required yes/no and why: No   Best contact number(s): [phone]  Details to clarify the request: Left against medical advice, sodium was 121 when she left and was in a COPD episode, has no oxygen, needs to go to Er/hospital.

## 2020-09-09 NOTE — TELEPHONE ENCOUNTER
Spoke with juwan he states he is the Dr. He states the pt left AMA and he is worried about her. He stated that she left with a low sodium level of 121 and copd exacerbation. Pt asked Dr. Keena Rico to give us a call.  He states that the pt did call back to the nurse and states that he informed her to go back to the hospital.     Verbally spoke with provider and her recommendation is for pt to go back to the hospital for care

## 2020-09-23 RX ORDER — ROPINIROLE 5 MG/1
TABLET, FILM COATED ORAL
Qty: 30 TAB | Refills: 0 | Status: SHIPPED | OUTPATIENT
Start: 2020-09-23 | End: 2020-10-06 | Stop reason: SDUPTHER

## 2020-09-27 RX ORDER — CLOTRIMAZOLE 10 MG/1
10 LOZENGE ORAL; TOPICAL 4 TIMES DAILY
Qty: 28 TAB | Refills: 0 | Status: SHIPPED | OUTPATIENT
Start: 2020-09-27 | End: 2020-10-04

## 2020-09-29 ENCOUNTER — TELEPHONE (OUTPATIENT)
Dept: FAMILY MEDICINE CLINIC | Age: 55
End: 2020-09-29

## 2020-09-30 ENCOUNTER — VIRTUAL VISIT (OUTPATIENT)
Dept: FAMILY MEDICINE CLINIC | Age: 55
End: 2020-09-30
Payer: MEDICARE

## 2020-09-30 ENCOUNTER — TRANSCRIBE ORDER (OUTPATIENT)
Dept: INTERNAL MEDICINE CLINIC | Age: 55
End: 2020-09-30

## 2020-09-30 DIAGNOSIS — M51.36 DDD (DEGENERATIVE DISC DISEASE), LUMBAR: ICD-10-CM

## 2020-09-30 DIAGNOSIS — M50.30 DDD (DEGENERATIVE DISC DISEASE), CERVICAL: Primary | ICD-10-CM

## 2020-09-30 PROCEDURE — 99442 PR PHYS/QHP TELEPHONE EVALUATION 11-20 MIN: CPT | Performed by: NURSE PRACTITIONER

## 2020-09-30 RX ORDER — TRAMADOL HYDROCHLORIDE 50 MG/1
50 TABLET ORAL 2 TIMES DAILY
Qty: 60 TAB | Refills: 2 | Status: SHIPPED | OUTPATIENT
Start: 2020-09-30 | End: 2020-10-30

## 2020-09-30 NOTE — PROGRESS NOTES
Bassam Ayoub is a 50 y.o. female, evaluated via audio-only technology on 9/30/2020 for Medication Refill  . Assessment & Plan:   Diagnoses and all orders for this visit:    1. DDD (degenerative disc disease), cervical  2. DDD (degenerative disc disease), lumbar  -     traMADoL (ULTRAM) 50 mg tablet; Take 1 Tab by mouth two (2) times a day for 30 days. Max Daily Amount: 100 mg.  - Refilled X 3 month. Advised when due for refills she will need to be seen in office, to sign pain contract and update UDS. Encouraged her to establish care with pain management provider. Reinforced SE/ADRs of medication and advised not to take with clonazepam.        Controlled Substance Monitoring:    RX Monitoring 9/30/2020   Periodic Controlled Substance Monitoring Possible medication side effects, risk of tolerance/dependence & alternative treatments discussed. ;No signs of potential drug abuse or diversion identified. 12  Subjective:     Had appt in office yesterday but was no show, unable to get video working on phone today so phone call was conducted  New patient to practice as of May 2020, moved from [place]   Requests refill of tramadol for DDD of lumbar and cervical spine, also reports she has hx arthritis of lumbar spine  Notes that she was getting norco from her physician in [place], has tied injections for pain in [place] w/o improvement in pain  Started seeing Radha Albrecht for pain management in [place] and notes that they suggested injections, however she has not had improvement w/ this in the past so she is currently working with her Radha Albrecht and [facility] to find another pain management provider.   Notes that she is not longer taking the norco for her pain because she was told by her GI doctor that she cannot take norco d/t 3cm umbilical hernia     Pt notes that she recently was admitted to [place] [address] from 9/14-9/17, was on life support for 3 days, states \"I guess I stopped breathing on my daughter and her boyfriend,\" reports they called EMS and she was able to be revived. Pt states she was told the medication she was on \"was killing me. \" States she was told it was the buspar, latuda, and wellbutrin. Reports she is still taking her clonazepam, \"that one doesn't have any negative effects, it helps keep me calm. \" Patient notes since discharge from the hospital she is having increased pain in her chest, likely from resuscitation efforts. Patient also reports she was told she has \"fluid building up around my heart and my lungs,\" notes that she is in process of establishing care with pulmonologist and cardiologist. Has home health coming out to assist with care currently. Also lives with daughter who helps w/ her care. Notes that she has narcan in the home. Has not completed UDS or pain management contract in office, however we have scanned UDS on file from March 2020     Opioid/Pain Management:    1. Has the patient signed a pain contract for chronic narcotic use? No - has not been seen in office since establishing care as new pt, advised when due for refills she needs to be seen in office in order to sign pain contract. 2.  Has the patient had a UDS or Serum screen as per guidelines as per [facility]?: yes (scanned in media section) - will update when next in office  3. Does patient meet necessary guidelines for Naloxone treatement per the [facility]?:  Yes - reports she has narcan in home  4. Has the Prescription Monitoring Program been reviewed? Yes  5. Does patient have a long term condition that requires long term use of a Narcotic? Yes  6. Has patient been tolerant of therapy and responsible to routine follow up and specialist follow-up? Yes           Prior to Admission medications    Medication Sig Start Date End Date Taking? Authorizing Provider   Oxygen 2 Devices by Nasal route.  Patient is on two liters of oxegen   Yes Provider, Historical   clotrimazole (MYCELEX) 10 mg efrain Take 1 Tab by mouth four (4) times daily for 7 days. -allow to dissolve in mouth and swallow. 9/27/20 10/4/20 Yes Nate Javier NP   rOPINIRole (REQUIP) 5 mg tablet TAKE 1 TABLET BY MOUTH EVERY DAY AT NIGHT 9/23/20  Yes Nate Javier NP   traMADoL (ULTRAM) 50 mg tablet Take 1 Tab by mouth two (2) times a day for 30 days. Max Daily Amount: 100 mg. 9/3/20 10/3/20 Yes Nate Javier NP   lisinopriL (PRINIVIL, ZESTRIL) 40 mg tablet TAKE 1 TABLET BY MOUTH EVERY DAY 9/1/20  Yes Nate Javier NP   Trelegy Ellipta 100-62.5-25 mcg inhaler INHALE 1 PUFF ONCE A DAY 8/29/20  Yes Nate Javier NP   DILT- mg capsule TAKE 1 CAPSULE BY MOUTH EVERY DAY 8/25/20  Yes Nate Javier NP   hydroCHLOROthiazide (HYDRODIURIL) 12.5 mg tablet TAKE 1 TABLET BY MOUTH EVERY DAY 8/25/20  Yes Nate Javier NP   fluticasone propion-salmeteroL (Advair Diskus) 250-50 mcg/dose diskus inhaler Take 1 Puff by inhalation two (2) times a day. 7/24/20  Yes Lauren LEGER NP   DULoxetine (CYMBALTA) 60 mg capsule TAKE 1 CAPSULE BY MOUTH TWICE A DAY 7/5/20  Yes Alena Resendiz MD   famotidine (PEPCID) 40 mg tablet TAKE 1 TABLET BY MOUTH EVERY DAY 6/29/20  Yes Nate Javier NP   lurasidone (Latuda) 40 mg tab tablet Take 40 mg by mouth. Yes Provider, Historical   prazosin (MINIPRESS) 1 mg capsule Take 1 mg by mouth nightly. Yes Provider, Historical   pantoprazole (PROTONIX) 40 mg tablet Take 40 mg by mouth daily. Yes Provider, Historical   albuterol (PROVENTIL VENTOLIN) 2.5 mg /3 mL (0.083 %) nebu by Nebulization route once. Yes Provider, Historical   pregabalin (LYRICA) 100 mg capsule Take 1 Cap by mouth daily. 8/18/20   Nate Javier NP   gabapentin (NEURONTIN) 300 mg capsule TAKE 1 CAPSULE BY MOUTH TWICE A DAY. MAX DAILY AMOUNT: 2 CAPSULES 7/28/20   Karen Rachel, NP   busPIRone (BUSPAR) 30 mg tablet Take 30 mg by mouth daily.     Provider, Historical     Patient Active Problem List    Diagnosis Date Noted    RLS (restless legs syndrome) 05/21/2020    DDD (degenerative disc disease), lumbar 05/21/2020       ROS    No flowsheet data found. Rena Che, who was evaluated through a patient-initiated, synchronous (real-time) audio only encounter, and/or her healthcare decision maker, is aware that it is a billable service, with coverage as determined by her insurance carrier. She provided verbal consent to proceed: Yes. She has not had a related appointment within my department in the past 7 days or scheduled within the next 24 hours.       Total Time: minutes: 11-20 minutes    Marin Gonzalez NP

## 2020-10-01 ENCOUNTER — TELEPHONE (OUTPATIENT)
Dept: FAMILY MEDICINE CLINIC | Age: 55
End: 2020-10-01

## 2020-10-01 ENCOUNTER — DOCUMENTATION ONLY (OUTPATIENT)
Dept: FAMILY MEDICINE CLINIC | Age: 55
End: 2020-10-01

## 2020-10-01 NOTE — TELEPHONE ENCOUNTER
Monica at [employer]- requesting approval of an order so they can see this patient for evaluation.  Samia's phone: [phone]

## 2020-10-01 NOTE — PROGRESS NOTES
[facility]ica at [facility] dropped off and gave paper of what order they need, leaving on your desk. 10.1.2020 [facility] thanks.

## 2020-10-02 ENCOUNTER — DOCUMENTATION ONLY (OUTPATIENT)
Dept: FAMILY MEDICINE CLINIC | Age: 55
End: 2020-10-02

## 2020-10-06 ENCOUNTER — TELEPHONE (OUTPATIENT)
Dept: FAMILY MEDICINE CLINIC | Age: 55
End: 2020-10-06

## 2020-10-06 NOTE — TELEPHONE ENCOUNTER
Caitlyn Vences from [facility] called and would like a call back regarding patient. She states that patient has had a horse voice for a week and has wheezing with \"experation. Patient also is complaining of chest pain when she breathes in. Caitlny Vences states that patients vitals are fine but does hear the wheezing in the upper right side.  Please call her back at [phone]

## 2020-10-07 ENCOUNTER — DOCUMENTATION ONLY (OUTPATIENT)
Dept: FAMILY MEDICINE CLINIC | Age: 55
End: 2020-10-07

## 2020-10-07 ENCOUNTER — TELEPHONE (OUTPATIENT)
Dept: FAMILY MEDICINE CLINIC | Age: 55
End: 2020-10-07

## 2020-10-07 RX ORDER — ROPINIROLE 5 MG/1
TABLET, FILM COATED ORAL
Qty: 30 TAB | Refills: 1 | Status: SHIPPED | OUTPATIENT
Start: 2020-10-07

## 2020-10-07 RX ORDER — HYDROCHLOROTHIAZIDE 12.5 MG/1
TABLET ORAL
Qty: 30 TAB | Refills: 1 | Status: SHIPPED | OUTPATIENT
Start: 2020-10-07

## 2020-10-07 NOTE — PROGRESS NOTES
Plan of Care faxed to Professor Izabel Craft at [facility]. Fax number [phone] confirmation number [id number].

## 2020-10-08 ENCOUNTER — DOCUMENTATION ONLY (OUTPATIENT)
Dept: FAMILY MEDICINE CLINIC | Age: 55
End: 2020-10-08

## 2020-10-08 NOTE — TELEPHONE ENCOUNTER
Attempt to reach pt unsuccessful. LVM for patient to [facility] so that we can assist her, or she can send a [employer] message as well.

## 2020-10-08 NOTE — TELEPHONE ENCOUNTER
Returned call to pt and ID x 3. Pt stated that she got a call from her pharm and was advised that she has 2 meds that are being rejected by her PCP. She was provided 2 prefixes of the meds and neither matched any meds that she has been rx'd. Pt will contact pharm to clarify. Pt states that she was also ordered a nebulizer from [address] by the hospital and she needs neb solution to go with. Completed paperwork via [employer] [facility] website to order neb solution for pt.

## 2020-10-13 ENCOUNTER — DOCUMENTATION ONLY (OUTPATIENT)
Dept: FAMILY MEDICINE CLINIC | Age: 55
End: 2020-10-13

## 2020-10-13 NOTE — PROGRESS NOTES
Monica at [address] [facility] was put on KENNEY Sheikh's desk to process

## 2020-10-20 ENCOUNTER — DOCUMENTATION ONLY (OUTPATIENT)
Dept: FAMILY MEDICINE CLINIC | Age: 55
End: 2020-10-20

## 2020-10-20 NOTE — PROGRESS NOTES
Plan of Care faxed to Professor Izabel Craft at [facility]. Fax number [phone] confirmation number [id number].

## 2020-10-23 DIAGNOSIS — R06.02 SOB (SHORTNESS OF BREATH): Primary | ICD-10-CM

## 2020-10-23 RX ORDER — ALBUTEROL SULFATE 90 UG/1
2 AEROSOL, METERED RESPIRATORY (INHALATION)
Qty: 1 INHALER | Refills: 1 | Status: SHIPPED | OUTPATIENT
Start: 2020-10-23

## 2020-10-26 RX ORDER — LANOLIN ALCOHOL/MO/W.PET/CERES
CREAM (GRAM) TOPICAL
Qty: 30 TAB | Refills: 0 | Status: SHIPPED | OUTPATIENT
Start: 2020-10-26

## 2020-10-27 ENCOUNTER — DOCUMENTATION ONLY (OUTPATIENT)
Dept: FAMILY MEDICINE CLINIC | Age: 55
End: 2020-10-27

## 2020-10-27 ENCOUNTER — TELEPHONE (OUTPATIENT)
Dept: FAMILY MEDICINE CLINIC | Age: 55
End: 2020-10-27

## 2020-10-27 RX ORDER — IPRATROPIUM BROMIDE AND ALBUTEROL SULFATE 2.5; .5 MG/3ML; MG/3ML
SOLUTION RESPIRATORY (INHALATION)
Qty: 90 ML | Refills: 0 | Status: SHIPPED | OUTPATIENT
Start: 2020-10-27

## 2020-10-27 NOTE — TELEPHONE ENCOUNTER
Patient requesting Ventolin for her nebulizer. She is requesting a 90 day supply. Pharmacy on file.  Patient's phone: [phone]

## 2020-10-28 NOTE — TELEPHONE ENCOUNTER
Returned call to pt and ID x 3. Pt states that she received the nebulizer solution. No further action needed at this time.

## 2020-10-29 ENCOUNTER — DOCUMENTATION ONLY (OUTPATIENT)
Dept: FAMILY MEDICINE CLINIC | Age: 55
End: 2020-10-29

## 2020-11-04 ENCOUNTER — DOCUMENTATION ONLY (OUTPATIENT)
Dept: FAMILY MEDICINE CLINIC | Age: 55
End: 2020-11-04

## 2021-05-30 NOTE — TELEPHONE ENCOUNTER
----- Message from Sirisha Robin sent at 9/29/2020  4:26 PM EDT -----  Regarding: DO Lobb/Telephone  General Message/Vendor Calls    Caller's first and last name: Briana Nageotte      Reason for call: Patient states that her Tramadol went to wrong Pharmacy, it should go to [facility] at [address]. Callback required yes/no and why: yes      Best contact number(s): [phone]      Details to clarify the request: patient states that her Tramadol Medication went to the wrong Pharmacy, it should have went to [address].       Lelia MCKEON Cousins
Called and spoke with patient. Advised that she needs to be seen for refill. Patient scheduled a VV for tomorrow as she stated that it is really hard for her to get out of the house.
She had a no show appt with Dr. Juliette Gillis today. This will not be filled without an appt.  Miquel Rankin
PRE-OP DIAGNOSIS:  Multigravida 30-May-2021 14:32:29  Jennifer Bender

## 2022-03-19 PROBLEM — M51.36 DDD (DEGENERATIVE DISC DISEASE), LUMBAR: Status: ACTIVE | Noted: 2020-05-21

## 2022-03-20 PROBLEM — G25.81 RLS (RESTLESS LEGS SYNDROME): Status: ACTIVE | Noted: 2020-05-21

## 2023-05-21 RX ORDER — FLUTICASONE PROPIONATE AND SALMETEROL 250; 50 UG/1; UG/1
1 POWDER RESPIRATORY (INHALATION) 2 TIMES DAILY
COMMUNITY
Start: 2020-07-24

## 2023-05-21 RX ORDER — GABAPENTIN 300 MG/1
CAPSULE ORAL
COMMUNITY
Start: 2020-07-28

## 2023-05-21 RX ORDER — LURASIDONE HYDROCHLORIDE 40 MG/1
40 TABLET, FILM COATED ORAL
COMMUNITY

## 2023-05-21 RX ORDER — FAMOTIDINE 40 MG/1
1 TABLET, FILM COATED ORAL DAILY
COMMUNITY
Start: 2020-06-29

## 2023-05-21 RX ORDER — BUSPIRONE HYDROCHLORIDE 30 MG/1
30 TABLET ORAL DAILY
COMMUNITY

## 2023-05-21 RX ORDER — PRAZOSIN HYDROCHLORIDE 1 MG/1
1 CAPSULE ORAL NIGHTLY
COMMUNITY

## 2023-05-21 RX ORDER — ALBUTEROL SULFATE 90 UG/1
2 AEROSOL, METERED RESPIRATORY (INHALATION) EVERY 4 HOURS PRN
COMMUNITY
Start: 2020-10-23

## 2023-05-21 RX ORDER — DULOXETIN HYDROCHLORIDE 60 MG/1
1 CAPSULE, DELAYED RELEASE ORAL 2 TIMES DAILY
COMMUNITY
Start: 2020-07-05

## 2023-05-21 RX ORDER — FLUTICASONE FUROATE, UMECLIDINIUM BROMIDE AND VILANTEROL TRIFENATATE 100; 62.5; 25 UG/1; UG/1; UG/1
POWDER RESPIRATORY (INHALATION)
COMMUNITY
Start: 2020-08-29

## 2023-05-21 RX ORDER — DILTIAZEM HYDROCHLORIDE 240 MG/1
1 CAPSULE, EXTENDED RELEASE ORAL DAILY
COMMUNITY
Start: 2020-08-25

## 2023-05-21 RX ORDER — IPRATROPIUM BROMIDE AND ALBUTEROL SULFATE 2.5; .5 MG/3ML; MG/3ML
SOLUTION RESPIRATORY (INHALATION)
COMMUNITY
Start: 2020-10-27

## 2023-05-21 RX ORDER — ALBUTEROL SULFATE 2.5 MG/3ML
SOLUTION RESPIRATORY (INHALATION) ONCE
COMMUNITY

## 2023-05-21 RX ORDER — PREGABALIN 100 MG/1
100 CAPSULE ORAL DAILY
COMMUNITY
Start: 2020-08-18

## 2023-05-21 RX ORDER — ROPINIROLE 5 MG/1
1 TABLET, FILM COATED ORAL NIGHTLY
COMMUNITY
Start: 2020-10-07

## 2023-05-21 RX ORDER — PANTOPRAZOLE SODIUM 40 MG/1
40 TABLET, DELAYED RELEASE ORAL DAILY
COMMUNITY

## 2023-05-21 RX ORDER — HYDROCHLOROTHIAZIDE 12.5 MG/1
1 TABLET ORAL DAILY
COMMUNITY
Start: 2020-10-07

## 2023-05-21 RX ORDER — LANOLIN ALCOHOL/MO/W.PET/CERES
1 CREAM (GRAM) TOPICAL DAILY
COMMUNITY
Start: 2020-10-26

## 2023-05-21 RX ORDER — LISINOPRIL 40 MG/1
1 TABLET ORAL DAILY
COMMUNITY
Start: 2020-09-01